# Patient Record
Sex: FEMALE | Race: WHITE
[De-identification: names, ages, dates, MRNs, and addresses within clinical notes are randomized per-mention and may not be internally consistent; named-entity substitution may affect disease eponyms.]

---

## 2018-04-19 ENCOUNTER — HOSPITAL ENCOUNTER (EMERGENCY)
Dept: HOSPITAL 74 - JER | Age: 83
Discharge: HOME | End: 2018-04-19
Payer: COMMERCIAL

## 2018-04-19 VITALS — TEMPERATURE: 97.7 F | HEART RATE: 61 BPM | SYSTOLIC BLOOD PRESSURE: 117 MMHG | DIASTOLIC BLOOD PRESSURE: 66 MMHG

## 2018-04-19 VITALS — BODY MASS INDEX: 22.8 KG/M2

## 2018-04-19 DIAGNOSIS — E78.5: ICD-10-CM
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
## 2018-04-19 DIAGNOSIS — Z86.73: ICD-10-CM

## 2018-04-19 DIAGNOSIS — M81.0: ICD-10-CM

## 2018-04-19 DIAGNOSIS — M54.5: Primary | ICD-10-CM

## 2018-04-19 DIAGNOSIS — I10: ICD-10-CM
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
## 2018-04-19 DIAGNOSIS — E78.00: ICD-10-CM

## 2018-04-19 NOTE — PDOC
History of Present Illness





<Luis Neves - Last Filed: 04/19/18 15:37>





- General


History Source: Patient


Exam Limitations: No Limitations





- History of Present Illness


Initial Comments: 





04/19/18 17:00


The patient is a 84 year old female, with a significant past medical history of 

HTN, HLD, CVA (no residual weakness), Osteoporosis who presents to the 

emergency department with lower back pain for the past week. Patient reports 

working in her garden a few days ago where she was bending forward shoveling 

soil. Patient experienced gradual onset of pain one day later. Patient reports 

constant lower back pain, 8/10 in severity, radiating to bilateral lower 

extremities. Patient reports occsionally using OTC medications such as Advil, 

Aleve, Tylenol with no relief. Patient reports pain is exacerbated upon 

movement and is alleviated while laying flat and sitting. Patient endorses 

unsteady gait and is unable to walk normally secondary to pain. Patient was 

seen by PCP and was sent to the ED for further evaluation. 





Patient denies any paresthesias, weakness, change in sensation, urine/bowel 

incontinence. 


Patient denies chest pain, palpitations, diaphoresis, headache or dizziness. 


Patient denies fever, chills, abdominal pain, nausea, vomit, diarrhea or 

constipation. 


Patient denies dysuria, frequency, urgency or hematuria. 


Patient denies sick contacts or recent travel. 





Allergies: NKA


Past surgical history: Appendectomy


Social history: None


PCP: Dr. Bowers 








<Rosina Dorsey - Last Filed: 04/19/18 17:00>





- General


Chief Complaint: Pain


Stated Complaint: WEAKNESS, BACK PAIN


Time Seen by Provider: 04/19/18 11:28





Past History





- Past Medical History


Anemia: No


Asthma: No


Cardiac Disorders: Yes


CVA: No


COPD: No


CHF: No


Dementia: No


Diabetes: No


GI Disorders: No


 Disorders: No


HTN: No (LOW)


Hypercholesterolemia: Yes


Liver Disease: No


Seizures: No


Thyroid Disease: No





- Surgical History


Appendectomy: Yes





- Suicide/Smoking/Psychosocial Hx


Smoking History: Never smoked


Have you smoked in the past 12 months: No


Number of Cigarettes Smoked Daily: 0


Cigars Per Day: 0


Information on smoking cessation initiated: No


Hx Alcohol Use: No


Drug/Substance Use Hx: No


Substance Use Type: None


Hx Substance Use Treatment: No





<Luis Neves - Last Filed: 04/19/18 15:37>





<Andi Dorseyhel - Last Filed: 04/19/18 17:00>





- Past Medical History


Allergies/Adverse Reactions: 


 Allergies











Allergy/AdvReac Type Severity Reaction Status Date / Time


 


No Known Allergies Allergy   Verified 12/14/16 14:03











Home Medications: 


Ambulatory Orders





Atorvastatin Ca [Lipitor] 5 mg PO HS 12/14/16 


Calcium 250Mg/Vit-D 125 Units [Oscal 250 mg+D -] 1 combo PO DAILY 12/14/16 


Gabapentin 100 mg PO DAILY 12/14/16 


Ibandronate Sodium 150 mg PO MONTHLY 12/14/16 


Clopidogrel Bisulfate [Plavix -] 75 mg PO DAILY #30 tablet 12/18/16 


Metoprolol Succinate [Toprol XL -] 25 mg PO BID  tab.sr.24h 12/18/16 


Melatonin [Melatin] 3 mg PO DAILY 04/19/18 











**Review of Systems





- Review of Systems


Able to Perform ROS?: Yes


Comments:: 





04/19/18 17:00


All other systems reviewed and are negative except noted in HPI





<WillianRosina - Last Filed: 04/19/18 17:00>





*Physical Exam





- Vital Signs


 Last Vital Signs











Temp Pulse Resp BP Pulse Ox


 


 97.7 F   61   17   117/66   97 


 


 04/19/18 10:56  04/19/18 10:56  04/19/18 10:56  04/19/18 10:56  04/19/18 10:56














<Luis Neves - Last Filed: 04/19/18 15:37>





- Vital Signs


 Last Vital Signs











Temp Pulse Resp BP Pulse Ox


 


 97.7 F   61   17   117/66   97 


 


 04/19/18 10:56  04/19/18 10:56  04/19/18 10:56  04/19/18 10:56  04/19/18 10:56














- Physical Exam


Comments: 





04/19/18 17:00


GENERAL: The patient is awake, alert, and fully oriented, Nontoxic - in no 

acute distress.


HEAD: Normocephalic, atraumatic.


EYES: extraocular movements intact, sclera anicteric, conjunctiva clear.


ENT: Normal voice, Moist mucous membranes.


NECK: Normal range of motion, No JVD


LUNGS: Breath sounds equal, clear to auscultation bilaterally. No wheezes, no 

rhonchi, no rales.


HEART: Regular rate and rhythm, normal S1 and S2 without murmur, rub or gallop.


ABDOMEN: Soft, nontender, normoactive bowel sounds. No guarding, no rebound. No 

masses. No CVA tenderness


EXTREMITIES: Normal range of motion, no edema. No clubbing or cyanosis. No cords

, erythema, or tenderness.


BACK: No focal midline tendenress, no parapsinal tenderness


NEUROLOGICAL: No facial asymmetry, Normal speech,antalgic gait


PSYCH: Normal mood, normal affect.


SKIN: Warm, Dry, normal turgor.





<Rosina Dorsey - Last Filed: 04/19/18 17:00>





ED Treatment Course





- Medications


Given in the ED: 


ED Medications














Discontinued Medications














Generic Name Dose Route Start Last Admin





  Trade Name Freq  PRN Reason Stop Dose Admin


 


Acetaminophen  650 mg  04/19/18 11:59  04/19/18 12:18





  Tylenol -  PO  04/19/18 12:00  650 mg





  ONCE ONE   Administration














<Rosina Dorsey - Last Filed: 04/19/18 17:00>





Medical Decision Making





- Medical Decision Making





04/19/18 11:41


84y F hx of htn, hl, OA cva, presents with 1 week of lower back pain radiating 

donw her LE b/l.  States that last week, she stood up and felt sharp pain in 

her back


not improved with motrin


no associated numbness/tingling, 


04/19/18 15:37


pts xray negative





will dc the pt with pmd fu and supporitve  care





I discussed the physical exam findings, ancillary test results and final 

diagnoses with the patient. I answered all of the patient's questions. The 

patient was satisfied with the care received and felt comfortable with the 

discharge plan and treatment plan. The patient will call their primary care 

physician within 24 hours to arrange follow-up and will return to the Emergency 

Department with any new, persistent or worsening symptoms. 








<Luis Neves - Last Filed: 04/19/18 15:37>





*DC/Admit/Observation/Transfer





- Discharge Dispostion


Admit: No





<Luis Neves - Last Filed: 04/19/18 15:37>





- Attestations


Scribe Attestion: 





04/19/18 17:00





Documentation prepared by Rosina Dorsey, acting as medical scribe for Luis Neves MD





<Rosina Dorsey - Last Filed: 04/19/18 17:00>


Diagnosis at time of Disposition: 


Back pain


Qualifiers:


 Back pain location: low back pain Chronicity: acute Back pain laterality: 

midline Sciatica presence: without sciatica Qualified Code(s): M54.5 - Low back 

pain








- Discharge Dispostion


Disposition: HOME


Condition at time of disposition: Improved





- Referrals


Referrals: 


Raleigh Bowers MD [Primary Care Provider] - 





- Patient Instructions


Printed Discharge Instructions:  DI for Low Back Pain


Additional Instructions: 


Return to the emergency department immediately with ANY new, persistent or 

worsening symptoms including numbness, tingling, weakness, fevers or any other 

concerns. 





Take ibuprofen (400mg)/tylenol(650mg) every 6 hours for 2 days.


Apply heat to your sore muscles. 





You MUST call and follow up with your doctor tomorrow for further evaluation of 

your symptoms. Your emergency department visit is not complete without a 

followup with your doctor for reevaluation.. Results were discussed with you. 

Please make sure your doctor reviews the results of your emergency evaluation.





Print Language: ENGLISH





- Post Discharge Activity

## 2023-06-27 ENCOUNTER — OFFICE (OUTPATIENT)
Dept: URBAN - METROPOLITAN AREA CLINIC 121 | Facility: CLINIC | Age: 88
Setting detail: OPHTHALMOLOGY
End: 2023-06-27
Payer: COMMERCIAL

## 2023-06-27 DIAGNOSIS — H35.3131: ICD-10-CM

## 2023-06-27 DIAGNOSIS — H35.371: ICD-10-CM

## 2023-06-27 DIAGNOSIS — H35.033: ICD-10-CM

## 2023-06-27 DIAGNOSIS — H40.023: ICD-10-CM

## 2023-06-27 DIAGNOSIS — Z96.1: ICD-10-CM

## 2023-06-27 DIAGNOSIS — H16.223: ICD-10-CM

## 2023-06-27 DIAGNOSIS — H10.45: ICD-10-CM

## 2023-06-27 DIAGNOSIS — L30.8: ICD-10-CM

## 2023-06-27 PROCEDURE — 92014 COMPRE OPH EXAM EST PT 1/>: CPT | Performed by: OPHTHALMOLOGY

## 2023-06-27 PROCEDURE — 92250 FUNDUS PHOTOGRAPHY W/I&R: CPT | Performed by: OPHTHALMOLOGY

## 2023-06-27 ASSESSMENT — REFRACTION_CURRENTRX
OD_OVR_VA: 20/
OS_AXIS: 177
OD_SPHERE: -1.00
OS_SPHERE: -1.50
OD_AXIS: 179
OS_OVR_VA: 20/
OS_CYLINDER: +0.75
OD_CYLINDER: +0.50

## 2023-06-27 ASSESSMENT — TONOMETRY
OD_IOP_MMHG: 14
OS_IOP_MMHG: 14

## 2023-06-27 ASSESSMENT — KERATOMETRY
OD_AXISANGLE_DEGREES: 007
OD_K2POWER_DIOPTERS: 46.25
OS_AXISANGLE_DEGREES: 016
OD_K1POWER_DIOPTERS: 45.00
OS_K2POWER_DIOPTERS: 46.25
OS_K1POWER_DIOPTERS: 44.75
METHOD_AUTO_MANUAL: AUTO

## 2023-06-27 ASSESSMENT — VISUAL ACUITY
OS_BCVA: 20/40
OD_BCVA: 20/40

## 2023-06-27 ASSESSMENT — SUPERFICIAL PUNCTATE KERATITIS (SPK)
OD_SPK: 1+
OS_SPK: 1+

## 2023-06-27 ASSESSMENT — REFRACTION_MANIFEST
OS_SPHERE: -1.50
OD_CYLINDER: +0.50
OD_AXIS: 172
OS_AXIS: 180
OD_SPHERE: -1.00
OS_CYLINDER: +0.75

## 2023-06-27 ASSESSMENT — SPHEQUIV_DERIVED
OD_SPHEQUIV: -0.375
OD_SPHEQUIV: -0.75
OS_SPHEQUIV: -1.75
OS_SPHEQUIV: -1.125

## 2023-06-27 ASSESSMENT — REFRACTION_AUTOREFRACTION
OS_AXIS: 012
OD_SPHERE: -1.25
OD_CYLINDER: +1.75
OD_AXIS: 020
OS_CYLINDER: +1.50
OS_SPHERE: -2.50

## 2023-06-27 ASSESSMENT — CONFRONTATIONAL VISUAL FIELD TEST (CVF)
OD_FINDINGS: FULL
OS_FINDINGS: FULL

## 2023-06-27 ASSESSMENT — AXIALLENGTH_DERIVED
OS_AL: 23.2993
OS_AL: 23.5392
OD_AL: 23.1135
OD_AL: 22.9741

## 2023-06-27 ASSESSMENT — PACHYMETRY
OD_CT_CORRECTION: -3
OD_CT_UM: 585
OS_CT_CORRECTION: -1
OS_CT_UM: 555

## 2024-05-29 ENCOUNTER — OFFICE (OUTPATIENT)
Dept: URBAN - METROPOLITAN AREA CLINIC 121 | Facility: CLINIC | Age: 89
Setting detail: OPHTHALMOLOGY
End: 2024-05-29
Payer: COMMERCIAL

## 2024-05-29 DIAGNOSIS — H40.023: ICD-10-CM

## 2024-05-29 PROCEDURE — 92133 CPTRZD OPH DX IMG PST SGM ON: CPT | Performed by: OPHTHALMOLOGY

## 2024-05-29 PROCEDURE — 99212 OFFICE O/P EST SF 10 MIN: CPT | Performed by: OPHTHALMOLOGY

## 2024-05-29 PROCEDURE — 92083 EXTENDED VISUAL FIELD XM: CPT | Performed by: OPHTHALMOLOGY

## 2024-11-08 ENCOUNTER — OFFICE (OUTPATIENT)
Facility: LOCATION | Age: 89
Setting detail: OPHTHALMOLOGY
End: 2024-11-08
Payer: COMMERCIAL

## 2024-11-08 DIAGNOSIS — L30.8: ICD-10-CM

## 2024-11-08 DIAGNOSIS — H35.3131: ICD-10-CM

## 2024-11-08 DIAGNOSIS — Z96.1: ICD-10-CM

## 2024-11-08 DIAGNOSIS — H16.223: ICD-10-CM

## 2024-11-08 DIAGNOSIS — H40.023: ICD-10-CM

## 2024-11-08 DIAGNOSIS — H10.45: ICD-10-CM

## 2024-11-08 DIAGNOSIS — H35.033: ICD-10-CM

## 2024-11-08 DIAGNOSIS — H35.371: ICD-10-CM

## 2024-11-08 PROCEDURE — 99214 OFFICE O/P EST MOD 30 MIN: CPT | Performed by: OPHTHALMOLOGY

## 2024-11-08 PROCEDURE — 92250 FUNDUS PHOTOGRAPHY W/I&R: CPT | Performed by: OPHTHALMOLOGY

## 2024-11-08 ASSESSMENT — SUPERFICIAL PUNCTATE KERATITIS (SPK)
OS_SPK: 1+
OD_SPK: 1+

## 2024-11-08 ASSESSMENT — REFRACTION_CURRENTRX
OD_AXIS: 179
OD_SPHERE: -1.00
OS_AXIS: 177
OS_CYLINDER: +0.75
OS_SPHERE: -1.50
OD_CYLINDER: +0.50
OS_OVR_VA: 20/
OD_OVR_VA: 20/

## 2024-11-08 ASSESSMENT — VISUAL ACUITY
OD_BCVA: 20/40
OS_BCVA: 20/40

## 2024-11-08 ASSESSMENT — REFRACTION_AUTOREFRACTION
OD_CYLINDER: +1.75
OS_SPHERE: -2.50
OD_SPHERE: -1.25
OS_CYLINDER: +1.50
OS_AXIS: 012
OD_AXIS: 020

## 2024-11-08 ASSESSMENT — KERATOMETRY
OD_K1POWER_DIOPTERS: 45.00
METHOD_AUTO_MANUAL: AUTO
OD_K2POWER_DIOPTERS: 46.25
OS_K2POWER_DIOPTERS: 46.25
OD_AXISANGLE_DEGREES: 007
OS_AXISANGLE_DEGREES: 016
OS_K1POWER_DIOPTERS: 44.75

## 2024-11-08 ASSESSMENT — PACHYMETRY
OS_CT_UM: 555
OD_CT_UM: 585
OS_CT_CORRECTION: -1
OD_CT_CORRECTION: -3

## 2024-11-08 ASSESSMENT — REFRACTION_MANIFEST
OS_CYLINDER: +0.75
OS_SPHERE: -1.50
OD_AXIS: 172
OD_SPHERE: -1.00
OD_CYLINDER: +0.50
OS_AXIS: 180

## 2024-11-08 ASSESSMENT — CONFRONTATIONAL VISUAL FIELD TEST (CVF)
OD_FINDINGS: FULL
OS_FINDINGS: FULL

## 2024-11-08 ASSESSMENT — TONOMETRY
OD_IOP_MMHG: 16
OS_IOP_MMHG: 16

## 2025-06-04 ENCOUNTER — OFFICE (OUTPATIENT)
Facility: LOCATION | Age: OVER 89
Setting detail: OPHTHALMOLOGY
End: 2025-06-04
Payer: COMMERCIAL

## 2025-06-04 DIAGNOSIS — H16.223: ICD-10-CM

## 2025-06-04 DIAGNOSIS — H40.023: ICD-10-CM

## 2025-06-04 DIAGNOSIS — Z96.1: ICD-10-CM

## 2025-06-04 DIAGNOSIS — H35.3131: ICD-10-CM

## 2025-06-04 PROCEDURE — 92134 CPTRZ OPH DX IMG PST SGM RTA: CPT | Performed by: OPHTHALMOLOGY

## 2025-06-04 PROCEDURE — 99213 OFFICE O/P EST LOW 20 MIN: CPT | Performed by: OPHTHALMOLOGY

## 2025-06-04 ASSESSMENT — REFRACTION_MANIFEST
OD_CYLINDER: +0.50
OD_AXIS: 172
OS_SPHERE: -1.50
OS_AXIS: 180
OS_CYLINDER: +0.75
OD_SPHERE: -1.00

## 2025-06-04 ASSESSMENT — REFRACTION_CURRENTRX
OS_AXIS: 177
OD_SPHERE: -1.00
OS_OVR_VA: 20/
OD_AXIS: 179
OS_CYLINDER: +0.75
OS_AXIS: 178
OS_CYLINDER: +0.75
OD_SPHERE: -1.00
OD_OVR_VA: 20/
OD_OVR_VA: 20/
OS_OVR_VA: 20/
OS_SPHERE: -1.50
OS_SPHERE: -1.50
OD_CYLINDER: +0.50
OD_CYLINDER: +0.50
OD_AXIS: 170

## 2025-06-04 ASSESSMENT — REFRACTION_AUTOREFRACTION
OD_CYLINDER: +1.75
OD_SPHERE: -1.25
OD_AXIS: 020
OS_SPHERE: -2.50
OS_AXIS: 012
OS_CYLINDER: +1.50

## 2025-06-04 ASSESSMENT — TONOMETRY
OS_IOP_MMHG: 15
OD_IOP_MMHG: 15

## 2025-06-04 ASSESSMENT — PACHYMETRY
OS_CT_UM: 555
OD_CT_CORRECTION: -3
OS_CT_CORRECTION: -1
OD_CT_UM: 585

## 2025-06-04 ASSESSMENT — VISUAL ACUITY
OS_BCVA: 20/50
OD_BCVA: 20/60

## 2025-06-04 ASSESSMENT — KERATOMETRY
OD_K1POWER_DIOPTERS: 45.00
OD_AXISANGLE_DEGREES: 007
OS_K2POWER_DIOPTERS: 46.25
OD_K2POWER_DIOPTERS: 46.25
METHOD_AUTO_MANUAL: AUTO
OS_K1POWER_DIOPTERS: 44.75
OS_AXISANGLE_DEGREES: 016

## 2025-06-04 ASSESSMENT — SUPERFICIAL PUNCTATE KERATITIS (SPK)
OS_SPK: 1+
OD_SPK: 1+

## 2025-06-04 ASSESSMENT — CONFRONTATIONAL VISUAL FIELD TEST (CVF)
OD_FINDINGS: FULL
OS_FINDINGS: FULL